# Patient Record
Sex: MALE | URBAN - METROPOLITAN AREA
[De-identification: names, ages, dates, MRNs, and addresses within clinical notes are randomized per-mention and may not be internally consistent; named-entity substitution may affect disease eponyms.]

---

## 2021-12-02 ENCOUNTER — APPOINTMENT (OUTPATIENT)
Dept: URBAN - METROPOLITAN AREA CLINIC 186 | Age: 31
Setting detail: DERMATOLOGY
End: 2021-12-02

## 2021-12-02 VITALS — TEMPERATURE: 97.7 F

## 2021-12-02 DIAGNOSIS — I87.2 VENOUS INSUFFICIENCY (CHRONIC) (PERIPHERAL): ICD-10-CM

## 2021-12-02 PROCEDURE — OTHER MIPS QUALITY: OTHER

## 2021-12-02 PROCEDURE — OTHER PRESCRIPTION: OTHER

## 2021-12-02 PROCEDURE — OTHER PRESCRIPTION MEDICATION MANAGEMENT: OTHER

## 2021-12-02 PROCEDURE — 99244 OFF/OP CNSLTJ NEW/EST MOD 40: CPT

## 2021-12-02 PROCEDURE — OTHER REFERRAL CORRESPONDENCE: OTHER

## 2021-12-02 PROCEDURE — OTHER COUNSELING: OTHER

## 2021-12-02 RX ORDER — TRIAMCINOLONE ACETONIDE 1 MG/G
CREAM TOPICAL
Qty: 454 | Refills: 1 | Status: ERX | COMMUNITY
Start: 2021-12-02

## 2021-12-02 ASSESSMENT — LOCATION DETAILED DESCRIPTION DERM
LOCATION DETAILED: LEFT DISTAL PRETIBIAL REGION
LOCATION DETAILED: RIGHT DISTAL PRETIBIAL REGION

## 2021-12-02 ASSESSMENT — LOCATION SIMPLE DESCRIPTION DERM
LOCATION SIMPLE: LEFT PRETIBIAL REGION
LOCATION SIMPLE: RIGHT PRETIBIAL REGION

## 2021-12-02 ASSESSMENT — LOCATION ZONE DERM: LOCATION ZONE: LEG

## 2021-12-02 NOTE — PROCEDURE: PRESCRIPTION MEDICATION MANAGEMENT
Plan: Will refer for assessment and Doppler study to Dr. Susan Escamilla, [facility], [facility] [address], [facility] [phone] [place] intervention[facility] radiology\\nPatient given order for standing desk at his work Plan: Will refer for assessment and Doppler study to Dr. Susan Escamilla, [facility], [facility] [address], [facility] [phone] [place] intervention[facility] radiology\\nPatient given order for standing desk at his work

## 2021-12-02 NOTE — HPI: RASH
What Type Of Note Output Would You Prefer (Optional)?: Bullet Format
Is The Patient Presenting As Previously Scheduled?: Yes
How Severe Is Your Rash?: mild
Is This A New Presentation, Or A Follow-Up?: Rash
Additional History: Patient said he gets these red spots on his legs a few weeks at a time. He’s had this happen for a few years. He said they haven’t been treated but Dr. Karl Hodges has ruled out Deep Vein Thrombosis and blood clots. He referred the patient to us for assistance